# Patient Record
Sex: MALE | Race: WHITE | NOT HISPANIC OR LATINO | URBAN - METROPOLITAN AREA
[De-identification: names, ages, dates, MRNs, and addresses within clinical notes are randomized per-mention and may not be internally consistent; named-entity substitution may affect disease eponyms.]

---

## 2018-04-04 ENCOUNTER — IMPORTED ENCOUNTER (OUTPATIENT)
Dept: URBAN - METROPOLITAN AREA CLINIC 50 | Facility: CLINIC | Age: 73
End: 2018-04-04

## 2018-04-24 ENCOUNTER — IMPORTED ENCOUNTER (OUTPATIENT)
Dept: URBAN - METROPOLITAN AREA CLINIC 50 | Facility: CLINIC | Age: 73
End: 2018-04-24

## 2018-04-27 ENCOUNTER — IMPORTED ENCOUNTER (OUTPATIENT)
Dept: URBAN - METROPOLITAN AREA CLINIC 50 | Facility: CLINIC | Age: 73
End: 2018-04-27

## 2018-05-02 ENCOUNTER — IMPORTED ENCOUNTER (OUTPATIENT)
Dept: URBAN - METROPOLITAN AREA CLINIC 50 | Facility: CLINIC | Age: 73
End: 2018-05-02

## 2018-05-02 NOTE — PATIENT DISCUSSION
"""S/P IOL OD: Symfony ZXR00 18.0 (ORA) +ORA/Femto/Arcs +TM/Omidria. Continue post operative instructions and drops per schedule.  """

## 2018-05-11 ENCOUNTER — IMPORTED ENCOUNTER (OUTPATIENT)
Dept: URBAN - METROPOLITAN AREA CLINIC 50 | Facility: CLINIC | Age: 73
End: 2018-05-11

## 2018-05-23 ENCOUNTER — IMPORTED ENCOUNTER (OUTPATIENT)
Dept: URBAN - METROPOLITAN AREA CLINIC 50 | Facility: CLINIC | Age: 73
End: 2018-05-23

## 2018-06-01 ENCOUNTER — IMPORTED ENCOUNTER (OUTPATIENT)
Dept: URBAN - METROPOLITAN AREA CLINIC 50 | Facility: CLINIC | Age: 73
End: 2018-06-01

## 2018-06-01 NOTE — PATIENT DISCUSSION
"""S/P IOL OS: Symfony ZXR00 17.5 +Femto +TM/Omidria. Continue post operative instructions and drops per schedule.  """

## 2018-06-29 ENCOUNTER — IMPORTED ENCOUNTER (OUTPATIENT)
Dept: URBAN - METROPOLITAN AREA CLINIC 50 | Facility: CLINIC | Age: 73
End: 2018-06-29

## 2018-06-29 NOTE — PATIENT DISCUSSION
"""S/P IOL OU: OD: Symfony ZXR00 18.0 (ORA) +ORAFemto/Arcs +TMOmidria. OS: Symfony ZXR00 17.5 +TM. MRx given today. "

## 2018-07-23 ENCOUNTER — IMPORTED ENCOUNTER (OUTPATIENT)
Dept: URBAN - METROPOLITAN AREA CLINIC 50 | Facility: CLINIC | Age: 73
End: 2018-07-23

## 2018-08-02 ENCOUNTER — IMPORTED ENCOUNTER (OUTPATIENT)
Dept: URBAN - METROPOLITAN AREA CLINIC 50 | Facility: CLINIC | Age: 73
End: 2018-08-02

## 2018-08-28 ENCOUNTER — IMPORTED ENCOUNTER (OUTPATIENT)
Dept: URBAN - METROPOLITAN AREA CLINIC 50 | Facility: CLINIC | Age: 73
End: 2018-08-28

## 2018-09-17 ENCOUNTER — IMPORTED ENCOUNTER (OUTPATIENT)
Dept: URBAN - METROPOLITAN AREA CLINIC 50 | Facility: CLINIC | Age: 73
End: 2018-09-17

## 2018-10-22 ENCOUNTER — IMPORTED ENCOUNTER (OUTPATIENT)
Dept: URBAN - METROPOLITAN AREA CLINIC 50 | Facility: CLINIC | Age: 73
End: 2018-10-22

## 2018-10-22 NOTE — PATIENT DISCUSSION
"""Recommend Bilateral Levator Repair with right lower lid blepharoplasty 11/06/2018 at [facility]

## 2018-10-22 NOTE — PATIENT DISCUSSION
"""Discussed removal of fat pad OD. Patient knows that he has to pay out of pocket.  Discussed out ""

## 2018-11-14 ENCOUNTER — IMPORTED ENCOUNTER (OUTPATIENT)
Dept: URBAN - METROPOLITAN AREA CLINIC 50 | Facility: CLINIC | Age: 73
End: 2018-11-14

## 2018-11-14 NOTE — PATIENT DISCUSSION
"""healing well sutures removed. continue jose enrique for 3 more nights then discontinue.  recommend ""

## 2018-12-10 ENCOUNTER — IMPORTED ENCOUNTER (OUTPATIENT)
Dept: URBAN - METROPOLITAN AREA CLINIC 50 | Facility: CLINIC | Age: 73
End: 2018-12-10

## 2019-02-12 ENCOUNTER — IMPORTED ENCOUNTER (OUTPATIENT)
Dept: URBAN - METROPOLITAN AREA CLINIC 50 | Facility: CLINIC | Age: 74
End: 2019-02-12

## 2019-03-04 ENCOUNTER — IMPORTED ENCOUNTER (OUTPATIENT)
Dept: URBAN - METROPOLITAN AREA CLINIC 50 | Facility: CLINIC | Age: 74
End: 2019-03-04

## 2019-03-12 ENCOUNTER — IMPORTED ENCOUNTER (OUTPATIENT)
Dept: URBAN - METROPOLITAN AREA CLINIC 50 | Facility: CLINIC | Age: 74
End: 2019-03-12

## 2019-07-16 NOTE — PATIENT DISCUSSION
Rx Ciloxan ii gtts Q 15 min X 4 hours, then Q 30 min for rest of today, then QH tomorrow, F/U Thursday.

## 2020-03-17 ENCOUNTER — IMPORTED ENCOUNTER (OUTPATIENT)
Dept: URBAN - METROPOLITAN AREA CLINIC 50 | Facility: CLINIC | Age: 75
End: 2020-03-17

## 2021-04-01 ENCOUNTER — IMPORTED ENCOUNTER (OUTPATIENT)
Dept: URBAN - METROPOLITAN AREA CLINIC 50 | Facility: CLINIC | Age: 76
End: 2021-04-01

## 2021-04-17 ASSESSMENT — TONOMETRY
OS_IOP_MMHG: 16
OD_IOP_MMHG: 48
OD_IOP_MMHG: 15
OS_IOP_MMHG: 17
OD_IOP_MMHG: 11
OD_IOP_MMHG: 14
OS_IOP_MMHG: 14
OD_IOP_MMHG: 14
OS_IOP_MMHG: 13
OS_IOP_MMHG: 13
OD_IOP_MMHG: 12
OD_IOP_MMHG: 15
OS_IOP_MMHG: 20
OS_IOP_MMHG: 17
OD_IOP_MMHG: 13
OS_IOP_MMHG: 15
OS_IOP_MMHG: 14
OD_IOP_MMHG: 16
OD_IOP_MMHG: 16
OD_IOP_MMHG: 12
OS_IOP_MMHG: 15
OD_IOP_MMHG: 16
OS_IOP_MMHG: 29
OD_IOP_MMHG: 16
OD_IOP_MMHG: 14
OS_IOP_MMHG: 16
OS_IOP_MMHG: 15
OD_IOP_MMHG: 18
OS_IOP_MMHG: 16

## 2021-04-17 ASSESSMENT — VISUAL ACUITY
OS_CC: 20/60+2
OS_BAT: 20/60
OS_BAT: 20/50
OD_SC: 20/25
OD_CC: J1-
OD_SC: 20/25-1
OS_SC: 20/30-
OS_PH: @ 16 IN
OD_PH: @ 16 IN
OD_OTHER: 20/40. 20/70.
OS_SC: 20/25-2
OS_CC: J1-
OS_CC: J2@ 16 IN
OD_SC: 20/25-1
OD_PH: @ 16 IN
OS_CC: J2
OS_OTHER: 20/50. 20/80.
OS_OTHER: 20/60. 20/80.
OS_CC: J1+
OD_BAT: 20/60
OS_OTHER: 20/60. 20/80.
OD_CC: J1
OD_PH: 20/30-
OD_OTHER: 20/60. 20/100.
OS_OTHER: 20/40. 20/50.
OD_SC: 20/25-2
OD_OTHER: 20/60. 20/100.
OS_BAT: 20/60
OD_OTHER: 20/60. 20/100.
OS_SC: 20/30-
OD_OTHER: 20/25. 20/25.
OD_SC: 20/80-2
OS_SC: 20/30+2
OS_SC: 20/20
OD_BAT: 20/25
OD_CC: J1+@ 15 IN
OS_BAT: 20/60
OD_BAT: 20/60
OD_SC: 20/25
OD_SC: 20/25-
OD_CC: J2
OD_SC: 20/20
OS_SC: 20/30
OD_SC: 20/30+3
OS_SC: 20/30
OS_SC: 20/100
OD_CC: 20/50-
OD_SC: 20/20
OS_OTHER: 20/60. 20/60.
OD_SC: 20/25
OS_SC: 20/80-2
OS_CC: J1+@ 15 IN
OD_SC: 20/25-
OS_BAT: 20/40
OS_SC: 20/25
OS_CC: 20/30-
OS_SC: 20/25-
OD_BAT: 20/40
OD_BAT: 20/60
OD_CC: J2@ 16 IN
OD_CC: J1+
OD_SC: 20/200
OS_CC: J1
OS_SC: 20/25-2
OS_SC: 20/25

## 2022-04-04 ENCOUNTER — PREPPED CHART (OUTPATIENT)
Dept: URBAN - METROPOLITAN AREA CLINIC 53 | Facility: CLINIC | Age: 77
End: 2022-04-04

## 2022-05-26 ENCOUNTER — COMPREHENSIVE EXAM (OUTPATIENT)
Dept: URBAN - METROPOLITAN AREA CLINIC 53 | Facility: CLINIC | Age: 77
End: 2022-05-26

## 2022-05-26 DIAGNOSIS — H43.393: ICD-10-CM

## 2022-05-26 PROCEDURE — 92014 COMPRE OPH EXAM EST PT 1/>: CPT

## 2022-05-26 ASSESSMENT — VISUAL ACUITY
OS_SC: 20/20
OU_SC: J3 @ 16IN
OD_SC: 20/25

## 2022-05-26 ASSESSMENT — TONOMETRY
OS_IOP_MMHG: 13
OD_IOP_MMHG: 13

## 2022-05-26 NOTE — PATIENT DISCUSSION
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 Educated patient regarding the diagnosis and patient understands the condition and prognosis and need for follow up care. Discussed retinal detachment warning signs and to return immediately if you experience flashes, floaters, or curtain/shade in vision. Follow up with me in 12 months for DFE.

## 2023-05-25 ENCOUNTER — COMPREHENSIVE EXAM (OUTPATIENT)
Dept: URBAN - METROPOLITAN AREA CLINIC 53 | Facility: CLINIC | Age: 78
End: 2023-05-25

## 2023-05-25 DIAGNOSIS — H43.393: ICD-10-CM

## 2023-05-25 PROCEDURE — 92014 COMPRE OPH EXAM EST PT 1/>: CPT

## 2023-05-25 ASSESSMENT — VISUAL ACUITY
OU_SC: J3
OU_SC: 20/20
OS_SC: 20/20
OD_CC: J1+
OS_CC: J1+
OU_CC: J1+
OD_SC: 20/25
OD_SC: J3
OS_SC: J3

## 2023-05-25 ASSESSMENT — TONOMETRY
OS_IOP_MMHG: 13
OD_IOP_MMHG: 13

## 2024-06-06 ENCOUNTER — COMPREHENSIVE EXAM (OUTPATIENT)
Dept: URBAN - METROPOLITAN AREA CLINIC 53 | Facility: CLINIC | Age: 79
End: 2024-06-06

## 2024-06-06 DIAGNOSIS — H43.393: ICD-10-CM

## 2024-06-06 PROCEDURE — 92014 COMPRE OPH EXAM EST PT 1/>: CPT

## 2024-06-06 ASSESSMENT — VISUAL ACUITY
OD_SC: 20/25
OU_SC: J2
OS_SC: 20/25-2

## 2024-06-06 ASSESSMENT — TONOMETRY
OD_IOP_MMHG: 12
OS_IOP_MMHG: 13

## 2025-07-25 ENCOUNTER — COMPREHENSIVE EXAM (OUTPATIENT)
Age: 80
End: 2025-07-25

## 2025-07-25 DIAGNOSIS — H43.813: ICD-10-CM

## 2025-07-25 DIAGNOSIS — H10.45: ICD-10-CM

## 2025-07-25 DIAGNOSIS — H04.121: ICD-10-CM

## 2025-07-25 PROCEDURE — 92134 CPTRZ OPH DX IMG PST SGM RTA: CPT | Mod: NC

## 2025-07-25 PROCEDURE — 99214 OFFICE O/P EST MOD 30 MIN: CPT
